# Patient Record
Sex: FEMALE | Race: WHITE | Employment: FULL TIME | URBAN - METROPOLITAN AREA
[De-identification: names, ages, dates, MRNs, and addresses within clinical notes are randomized per-mention and may not be internally consistent; named-entity substitution may affect disease eponyms.]

---

## 2019-03-04 ENCOUNTER — OFFICE VISIT (OUTPATIENT)
Dept: OBGYN | Age: 44
End: 2019-03-04
Payer: COMMERCIAL

## 2019-03-04 ENCOUNTER — HOSPITAL ENCOUNTER (OUTPATIENT)
Age: 44
Setting detail: SPECIMEN
Discharge: HOME | End: 2019-03-04
Payer: COMMERCIAL

## 2019-03-04 VITALS
HEIGHT: 64 IN | WEIGHT: 117.2 LBS | BODY MASS INDEX: 20.01 KG/M2 | DIASTOLIC BLOOD PRESSURE: 68 MMHG | SYSTOLIC BLOOD PRESSURE: 118 MMHG

## 2019-03-04 DIAGNOSIS — Z01.419 WELL WOMAN EXAM WITH ROUTINE GYNECOLOGICAL EXAM: Primary | ICD-10-CM

## 2019-03-04 DIAGNOSIS — Z12.39 SCREENING FOR BREAST CANCER: ICD-10-CM

## 2019-03-04 DIAGNOSIS — Z11.51 SCREENING FOR HPV (HUMAN PAPILLOMAVIRUS): ICD-10-CM

## 2019-03-04 PROCEDURE — 99386 PREV VISIT NEW AGE 40-64: CPT | Performed by: ADVANCED PRACTICE MIDWIFE

## 2019-03-04 PROCEDURE — G0145 SCR C/V CYTO,THINLAYER,RESCR: HCPCS

## 2019-03-04 PROCEDURE — 87624 HPV HI-RISK TYP POOLED RSLT: CPT

## 2019-03-04 ASSESSMENT — PATIENT HEALTH QUESTIONNAIRE - PHQ9
1. LITTLE INTEREST OR PLEASURE IN DOING THINGS: 0
SUM OF ALL RESPONSES TO PHQ QUESTIONS 1-9: 0
2. FEELING DOWN, DEPRESSED OR HOPELESS: 0
SUM OF ALL RESPONSES TO PHQ9 QUESTIONS 1 & 2: 0
SUM OF ALL RESPONSES TO PHQ QUESTIONS 1-9: 0

## 2019-03-04 ASSESSMENT — ENCOUNTER SYMPTOMS
ALLERGIC/IMMUNOLOGIC NEGATIVE: 1
EYES NEGATIVE: 1
RESPIRATORY NEGATIVE: 1
GASTROINTESTINAL NEGATIVE: 1

## 2019-03-05 DIAGNOSIS — Z11.51 SCREENING FOR HPV (HUMAN PAPILLOMAVIRUS): ICD-10-CM

## 2019-03-05 DIAGNOSIS — Z01.419 WELL WOMAN EXAM WITH ROUTINE GYNECOLOGICAL EXAM: ICD-10-CM

## 2019-03-06 LAB
COMMENT: NORMAL
HPV SAMPLE: NORMAL
HPV, GENOTYPE 16: NOT DETECTED
HPV, GENOTYPE 18: NOT DETECTED
HPV, HIGH RISK OTHER: NOT DETECTED
HPV, INTERPRETATION: NORMAL
SPECIMEN DESCRIPTION: NORMAL

## 2019-03-07 DIAGNOSIS — Z12.39 SCREENING FOR BREAST CANCER: ICD-10-CM

## 2019-03-15 LAB — CYTOLOGY REPORT: NORMAL

## 2020-03-09 ENCOUNTER — OFFICE VISIT (OUTPATIENT)
Dept: OBGYN | Age: 45
End: 2020-03-09
Payer: COMMERCIAL

## 2020-03-09 VITALS
DIASTOLIC BLOOD PRESSURE: 68 MMHG | WEIGHT: 117 LBS | SYSTOLIC BLOOD PRESSURE: 116 MMHG | BODY MASS INDEX: 19.97 KG/M2 | HEIGHT: 64 IN

## 2020-03-09 PROCEDURE — 99396 PREV VISIT EST AGE 40-64: CPT | Performed by: ADVANCED PRACTICE MIDWIFE

## 2020-03-09 NOTE — PROGRESS NOTES
YEARLY PHYSICAL    Date of service: 3/9/2020    Nolan Steel  Is a 50 y.o.   female    PT's PCP is: David Campbell MD     : 1975                                             Subjective:       No LMP recorded. Patient has had a hysterectomy. Are your menses regular: not applicable    OB History    Para Term  AB Living   3 2   2 1 2   SAB TAB Ectopic Molar Multiple Live Births             2      # Outcome Date GA Lbr Chad/2nd Weight Sex Delivery Anes PTL Lv   3  08    F Vag-Spont EPI  JACINDA   2 AB 2003 [id number]          1  01    M Vag-Spont EPI  JACINDA        Social History     Tobacco Use   Smoking Status Current Every Day Smoker    Packs/day: 0.25    Types: Cigarettes   Smokeless Tobacco Never Used        Social History     Substance and Sexual Activity   Alcohol Use Yes       Family History   Problem Relation Age of Onset    Cancer Paternal Grandmother     Diabetes Maternal Grandmother     Alzheimer's Disease Maternal Grandmother     Heart Attack Maternal Grandfather        Any family history of breast or ovarian cancer: No    Any family history of blood clots: No      Allergies: Flagyl [metronidazole] and Paroxetine hcl    No current outpatient medications on file.     Social History     Substance and Sexual Activity   Sexual Activity Yes    Partners: Male    Comment: n/a       Any bleeding or pain with intercourse: No    Last Yearly:  3-4-2019    Last pap: 3-4-2019    Last HPV: 3-4-2019    Last Mammogram: 3-6-2019    Last Ayde Reina never    Last colorectal screen- type:scope for hemorrhoids  Date:     Do you do self breast exams: Yes    Past Medical History:   Diagnosis Date    Low blood pressure        Past Surgical History:   Procedure Laterality Date    HAND SURGERY      X3    HYSTERECTOMY, VAGINAL      Total, NO CERVIX    TONSILLECTOMY         Family History   Problem Relation Age of Onset    Cancer Paternal Grandmother     Diabetes Maternal Grandmother     Alzheimer's Disease Maternal Grandmother     Heart Attack Maternal Grandfather        Chief Complaint   Patient presents with    Gynecologic Exam          PE:  Vital Signs  Blood pressure 116/68, height 5' 4\" (1.626 m), weight 117 lb (53.1 kg). Labs:    No results found for this visit on 03/09/20. NURSE: LEESA    HPI: Patient is here today for routine well woman exam.  Patient does have a history of a hysterectomy but does still have her ovaries. Patient states for the most part she is not having any significant problems however she does note that both of her hands hurt bilaterally which she is going to see ECP for    Review of Systems   Musculoskeletal: Positive for joint swelling. All other systems reviewed and are negative. Objective  Lymphatic:   no lymphadenopathy  Heent:   negative   Cor: regular rate and rhythm, no murmurs              Pul:clear to auscultation bilaterally- no wheezes, rales or rhonchi, normal air movement, no respiratory distress      GI: Abdomen soft, non-tender. BS normal. No masses,  No organomegaly           Extremities: normal strength, tone, and muscle mass, bilateral hand pain   Breasts: Breast:normal appearance, no masses or tenderness   Pelvic Exam: GENITAL/URINARY:  External Genitalia:  General appearance; normal, Hair distribution; normal, Lesions absent  Urethra: Fullness absent, Masses absent  Bladder:  Fullness absent, Masses absent, Tenderness absent, Cystocele absent  Vagina:  General appearance normal, Estrogen effect normal, Discharge absent, Lesions absent, Pelvic support normal  Cervix: Surgical removal  Uterus: Surgical removal  Adenexa: Masses absent, Tenderness absent                                    Vaginal discharge: no vaginal discharge                              Assessment and Plan          Diagnosis Orders   1.  Well woman exam with routine gynecological exam     2. Encounter for screening mammogram for malignant neoplasm of breast  MERLY DIGITAL SCREEN W CAD BILATERAL             Denise LLandon Elmore does not currently have medications on file. Return in about 1 year (around 3/9/2021) for yearly. She was also counseled on her preventative health maintenance recommendations and follow-up. There are no Patient Instructions on file for this visit.     Aries Torres,3/9/2020 8:16 PM

## 2020-08-17 ENCOUNTER — HOSPITAL ENCOUNTER (OUTPATIENT)
Age: 45
Setting detail: SPECIMEN
Discharge: HOME | End: 2020-08-17
Payer: COMMERCIAL

## 2020-08-17 PROCEDURE — U0003 INFECTIOUS AGENT DETECTION BY NUCLEIC ACID (DNA OR RNA); SEVERE ACUTE RESPIRATORY SYNDROME CORONAVIRUS 2 (SARS-COV-2) (CORONAVIRUS DISEASE [COVID-19]), AMPLIFIED PROBE TECHNIQUE, MAKING USE OF HIGH THROUGHPUT TECHNOLOGIES AS DESCRIBED BY CMS-2020-01-R: HCPCS

## 2020-08-17 PROCEDURE — C9803 HOPD COVID-19 SPEC COLLECT: HCPCS

## 2020-08-20 LAB — SARS-COV-2, NAA: NOT DETECTED

## 2021-07-15 DIAGNOSIS — Z12.31 ENCOUNTER FOR SCREENING MAMMOGRAM FOR MALIGNANT NEOPLASM OF BREAST: Primary | ICD-10-CM
